# Patient Record
Sex: FEMALE | Race: OTHER | Employment: UNEMPLOYED | URBAN - METROPOLITAN AREA
[De-identification: names, ages, dates, MRNs, and addresses within clinical notes are randomized per-mention and may not be internally consistent; named-entity substitution may affect disease eponyms.]

---

## 2021-09-23 ENCOUNTER — HOSPITAL ENCOUNTER (OUTPATIENT)
Age: 26
Discharge: HOME | End: 2021-09-23
Attending: OBSTETRICS & GYNECOLOGY | Admitting: OBSTETRICS & GYNECOLOGY
Payer: COMMERCIAL

## 2021-09-23 VITALS
HEIGHT: 63 IN | SYSTOLIC BLOOD PRESSURE: 101 MMHG | RESPIRATION RATE: 18 BRPM | BODY MASS INDEX: 33.31 KG/M2 | DIASTOLIC BLOOD PRESSURE: 56 MMHG | HEART RATE: 102 BPM | TEMPERATURE: 99.2 F | WEIGHT: 188 LBS

## 2021-09-23 PROBLEM — Z34.90 PREGNANCY: Status: ACTIVE | Noted: 2021-09-23

## 2021-09-23 LAB
APPEARANCE UR: ABNORMAL
BILIRUB UR QL: NEGATIVE
COLOR UR: YELLOW
GLUCOSE UR QL STRIP.AUTO: NEGATIVE MG/DL
KETONES UR-MCNC: NEGATIVE MG/DL
LEUKOCYTE ESTERASE UR QL STRIP: ABNORMAL
NITRITE UR QL: NEGATIVE
PH UR: 7 [PH] (ref 5–9)
PROT UR QL: ABNORMAL MG/DL
RBC # UR STRIP: ABNORMAL /UL
SERVICE CMNT-IMP: ABNORMAL
SERVICE CMNT-IMP: NORMAL
SP GR UR: 1.02 (ref 1–1.02)
UROBILINOGEN UR QL: 0.2 EU/DL (ref 0.2–1)
WET PREP GENITAL: NORMAL

## 2021-09-23 PROCEDURE — 99284 EMERGENCY DEPT VISIT MOD MDM: CPT

## 2021-09-23 PROCEDURE — 59025 FETAL NON-STRESS TEST: CPT

## 2021-09-23 PROCEDURE — 87086 URINE CULTURE/COLONY COUNT: CPT

## 2021-09-23 PROCEDURE — 81003 URINALYSIS AUTO W/O SCOPE: CPT

## 2021-09-23 PROCEDURE — 87210 SMEAR WET MOUNT SALINE/INK: CPT

## 2021-09-23 RX ORDER — GLUCOSAMINE SULFATE 1500 MG
POWDER IN PACKET (EA) ORAL DAILY
COMMUNITY

## 2021-09-23 RX ORDER — ASPIRIN 325 MG
1 TABLET, DELAYED RELEASE (ENTERIC COATED) ORAL
Qty: 45 G | Refills: 0 | Status: SHIPPED | OUTPATIENT
Start: 2021-09-23 | End: 2021-10-30

## 2021-09-23 NOTE — PROGRESS NOTES
1915 this RN assumes care for the pt at this time    5 RN and KAREN Ponce bedside.  #[id number]  Pt is a 22 y.o. No obstetric history on file. at Unknown, arrived to L&D triage with c/o small amount of vaginal spotting around 1720. Denies recent intercourse. Reports positive fetal movement. Reports some vomiting last Sunday and some abdominal pain. EFM and TOCO applied, call bell within reach. 1928 speculum exam in process using  without video. Wet prep sample obtained. No blood visualized. 1932 SVE 1/thick/high. 2115 RN bedside using  #[id number]. Pt discharged home. Verbal and written discharge instructions given including LOF, vaginal bleeding, kick counts, s/s of active labor, importance of drinking plenty of fluids, eating regular meals, and keeping scheduled appointments. Pt verbalizes understanding. Pt states she has a ride home from hospital.    2127 Pt ambulated off unit in stable condition.

## 2021-09-24 NOTE — DISCHARGE INSTRUCTIONS
Patient Education   Patient Education   Patient Education   Patient Education   Patient Education   Patient Education        Semanas 34 a 39 de taylor embarazo: Instrucciones de cuidado  Weeks 34 to 36 of Your Pregnancy: Care Instructions  Instrucciones de cuidado    A estas [place], taylor bebé y taylor abdomen habrán crecido considerablemente. Mili es Gould de bishop a paco. Los pulmones de taylor bebé están mili listos para respirar aire. Los huesos de la tank de taylor bebé ahora son bastante firmes albert para protegerla ashley se mantienen lo suficientemente blandos albert para atravesar el canal de [place]. Es posible que sienta entusiasmo, martin, ansiedad o miedo. Quizá se pregunte cómo se dará cuenta de si está en trabajo de parto o qué esperar en sarita momento. Trate de ser flexible con nadira expectativas respecto del nacimiento. Dado que cada nacimiento es diferente, no hay manera de saber exactamente cómo será taylor parto. Esta hoja de cuidados la ayudará a saber qué esperar y cómo prepararse. Le podría facilitar el parto. Si todavía no le marie aplicado la vacuna Tdap (tétanos, difteria y tos [place]) marline jovana [facility], hable con taylor médico acerca de aplicársela. Titi Adame a proteger a taylor recién nacido contra la infección por tos ferina. En la semana 36, a la mayoría de las mujeres se les hace babatunde prueba de estreptococos del simone B (GBS, por nadira siglas en inglés). Los estreptococos del simone B son bacterias comunes que pueden vivir en la vagina y el recto. Pueden hacer que taylor bebé se enferme después del parto. Si el resultado es positivo, usted recibirá antibióticos marline el trabajo de [place]. Los medicamentos evitarán que taylor bebé contraiga las bacterias. La atención de seguimiento es babatunde parte clave de taylor tratamiento y seguridad. Asegúrese de hacer y acudir a todas las citas, y llame a taylor médico si está teniendo problemas.  También es babatunde buena idea saber los resultados de nadira exámenes y mantener babatunde lista de los medicamentos que geovanna.  ¿Cómo puede cuidarse en el hogar? Aprenda sobre las alternativas para aliviar el dolor  · El dolor se manifiesta de modo diferente en cada angela. Hable con taylor médico acerca de nadira sentimientos sobre el dolor. · Puede elegir entre varias formas de aliviar el dolor. Estas incluyen medicamentos o técnicas de respiración, así albert medidas para estar cómoda. Usted puede utilizar más de Edgar Fury opción. · Si elige un analgésico (medicamento para el dolor) marline el trabajo de [place], hable con taylor médico acerca de nadira opciones. Algunos medicamentos reducen la ansiedad y [place] [facility] a aliviar parte del dolor. Otros adormecen la parte inferior del cuerpo para que no sienta dolor. · Asegúrese de decirle a taylor médico acerca de taylor elección de analgésico antes de empezar el trabajo de parto o muy temprano en el [place]. Es posible que pueda cambiar de parecer a medida que avanza el [place]. · Jessica vez se duerme a babatunde angela con medicamentos administrados a través de babatunde máscara o por vía intravenosa (IV). Trabajo de parto y [place]  · La primera etapa del [place] de parto se divide en keiko fases: Michalene Dural y de transición. ? La mayoría de las mujeres experimentan la fase latente del [place]o en nadira hogares. Usted puede [employer] o descansar, comer refrigerios livianos, beber líquidos mayte y comenzar a contar las contracciones. ? Cuando advierta que se le vuelve difícil hablar marline babatunde contracción, es posible que esté por pasar a la fase activa. Marline la fase Angel Grandchild, debería ir al hospital si no está allí aún. ? Usted está en la fase activa cuando tiene contracciones cada 3 o 4 minutos y balbuena alrededor de 60 segundos. El skyla uterino comienza a abrirse con más rapidez.  ? Si se le rompe la debbie, las contracciones serán más intensas y más frecuentes. ? Marline la fase de transición, el skyla uterino se estira y las contracciones se producen con Jorge Irineo.   ? Fabiano Willie tenga deseos de pujar, sin embargo es posible que el skyla uterino aún no esté preparado. El médico le dirá cuándo pujar. · La segunda etapa comienza cuando el skyla uterino se abre por completo y usted está lista para pujar. ? Las contracciones son muy intensas a fin de empujar al bebé por el canal de parto. ? Sentirá la necesidad de pujar. Podría sentir albert si tuviera ganas de evacuar el intestino. ? Ebb Dee Dee entrenen a [employer]. Estas contracciones serán muy intensas ashley no ocurrirán con tanta frecuencia. Puede descansar un poco entre contracciones. ? Es posible que esté sensible e irritable. Es posible que no se dé cuenta de lo que pasa a taylor alrededor. ? Un último esfuerzo y habrá nacido taylor bebé. · La tercera etapa ocurre cuando con unas cuantas contracciones más se expulsa la placenta. [place] puede durar 30 minutos o menos. · La cuarta etapa es la de recuperación. Es posible que se sienta abrumada con las emociones y exhausta ashley alerta. Madelin es un buen momento para comenzar el amamantamiento. ¿Dónde puede encontrar más información en inglés? Vaya a [email or web address]  Sonia Roca [id number] en la búsqueda para aprender más acerca de \"Semanas 34 a 39 de taylor embarazo: Instrucciones de cuidado. \"  Revisado: 16 junio, 2021               Versión del contenido: 13.0  © 5176-0767 Healthwise, Incorporated. Las instrucciones de cuidado fueron adaptadas bajo licencia por Good Help Connections (which disclaims liability or warranty for this information). Si usted tiene [facility] afección médica o sobre estas instrucciones, siempre pregunte a taylor profesional de jarocho. [employer]ner[employer] Incorporated niega toda garantía o responsabilidad por taylor uso de esta información. Presión arterial berto marline el embarazo:  Instrucciones de cuidado  High Blood Pressure in Pregnancy: Care Instructions  Generalidades     Presión arterial berto (hipertensión) significa que la fuerza de la sue contra las azevedo de las arterias es demasiado parvin. Los problemas de presión arterial berto marline el embarazo incluyen:  · Hipertensión crónica. Esta es la presión arterial berto que comienza antes del embarazo. · Hipertensión gestacional. Esta es la presión arterial berto que comienza en el luis a o tercer trimestre del Sharad Spark. · Preeclampsia. Madelin es un problema que incluye presión arterial berto y señales de lesión orgánica marline el Sharad Spark. En algunos casos, conduce a eclampsia. La eclampsia provoca convulsiones. La presión arterial berto marline el embarazo puede afectar la cantidad de oxígeno y nutrientes que recibe taylor bebé. [place] puede afectar el crecimiento del bebé. La presión arterial berto también puede causar otros problemas graves tanto para usted albert para taylor bebé, albert desprendimiento de placenta. Para prevenir los [place], usted y taylor bebé serán supervisados muy de cerca. Tendrá que medirse la presión arterial con frecuencia marline el embarazo y posiblemente después de él. Si taylor presión arterial se eleva repentinamente o es muy berto marline el embarazo, el médico podría recetarle medicamentos. Estos generalmente pueden controlar la presión arterial.  Si taylor presión arterial afecta taylor jarocho o la de taylor bebé, es posible que taylor médico deba traer al mansoor al bebé antes de [place]. Después de que taylor bebé nazca, la presión arterial probablemente mejorará. Meche, a veces, los problemas de presión arterial continúan después del parto. La atención de seguimiento es babatunde parte clave de taylor tratamiento y seguridad. Asegúrese de hacer y acudir a todas las citas, y llame a taylor médico si está teniendo problemas. También es babatunde buena idea saber los resultados de nadira exámenes y mantener babatunde lista de los medicamentos que geovanna. ¿Cómo puede cuidarse en el hogar? · Tómese y anote la presión arterial en el hogar si el médico le dice que lo mar.   · [employer] medicamentos exactamente KB Sells	[place] fueron recetados. Llame a taylor médico si tristan estar teniendo problemas con taylor medicamento. · No fume. Esta es babatunde de las mejores cosas que puede hacer para que taylor bebé esté maria eugenia. Si necesita ayuda para dejar de fumar, hable con taylor médico sobre programas y medicamentos para dejar de fumar. Estos pueden aumentar nadira probabilidades de dejar de fumar para siempre. · No suba mucho de peso marline el embarazo. Hable con taylor médico acerca de cuánto aumento de peso es saludable. · Siga babatunde dieta saludable. · Si taylor médico lo autoriza, mar ejercicio en forma regular. Caminar o nadar varias veces a la semana puede ser saludable para usted y para taylor bebé. · Reduzca el estrés y encuentre tiempo para relajarse. [place] es muy importante si sigue trabajando o tiene babatunde agenda [place]. También es importante si tiene niños pequeños en casa. ¿Cuándo debe pedir ayuda? Comparta esta información con taylor missy o babatunde amiga. Pueden ayudarla a prestar atención a las señales de advertencia. Llame al 911  en cualquier momento que considere que necesita atención de [place]. Por ejemplo, llame si:    · Se desmayó (perdió el conocimiento).     · Tiene convulsiones. Llame a taylor médico ahora mismo o busque atención médica inmediata si:    · Tiene síntomas de preeclampsia, tales albert:  ? Hinchazón repentina de la arsenio, las jaylin o los pies. ? Nuevos problemas de visión (albert oscurecimiento, al borroso o al puntos). ? Dolor de tank intenso.     · Atylor presión arterial es muy berto, por ejemplo, 160/110 o más berto.     · Taylor presión arterial es más berto de lo que el médico le dijo que debería ser, o se eleva rápidamente.     · Tiene náuseas o vómitos nuevos.     · Piensa que está de parto.     · Tiene dolor en el abdomen o en la pelvis. Preste especial atención a los cambios en taylor jarocho y asegúrese de comunicarse con taylor médico si:    · Aumenta de peso con Leoncio Pack. ¿Dónde puede encontrar más información en inglés?   Dragan July a [email or web address]  Escriba [id number] en la búsqueda para aprender más acerca de \"Presión arterial berto marline el embarazo: Instrucciones de cuidado. \"  Revisado: 16 junio, 2021               Versión del contenido: 13.0  © 3437-1178 Healthwise, Incorporated. Las instrucciones de cuidado fueron adaptadas bajo licencia por Good SinCola Connections (which disclaims liability or warranty for this information). Si usted tiene [place] afección médica o sobre estas instrucciones, siempre pregunte a taylor profesional de jarocho. Healthwise, Incorporated niega toda garantía o responsabilidad por taylor uso de esta información. Precauciones en el embarazo: Instrucciones de cuidado  Pregnancy Precautions: Care Instructions  Instrucciones de cuidado     No hay babatunde manera castillo de prevenir el trabajo de parto antes de la fecha esperada (trabajo de parto prematuro) o de prevenir la mayoría de otros problemas en el [facility]. Meche hay cosas que puede hacer para aumentar las probabilidades de tener un embarazo saludable. Vaya a nadira citas, siga los consejos de taylor médico y cuídese. Coma evita y mar ejercicio (si taylor médico lo permite). Y asegúrese de tammie abundante agua. La atención de seguimiento es babatunde parte clave de taylor tratamiento y seguridad. Asegúrese de hacer y acudir a todas las citas, y llame a taylor médico si está teniendo problemas. También es babatunde buena idea saber los resultados de nadira exámenes y mantener babatunde lista de los medicamentos que geovanna. ¿Cómo puede cuidarse en el hogar? · Asegúrese de asistir a las citas prenatales. Taylor médico le tomará la presión arterial en cada consulta. Taylor médico también comprobará si tiene proteínas en taylor orina. Tanto la presión arterial berto albert la presencia de proteínas en la orina son señales de preeclampsia. Esta afección puede ser peligrosa tanto para usted albert para taylor bebé. · Ludivina mucho líquido. La deshidratación puede causar contracciones.  Si tiene babatunde enfermedad renal, cardíaca o hepática y tiene que restringir los líquidos, hable con machuca médico antes de aumentar la cantidad de líquido que toñito. · Informe a machuca médico de inmediato si nota algún síntoma de infección, albert:  ? Ardor cuando orina. ? Flujo con mal olor de la vagina. ? Comezón en la vagina. ? Cris Areola sin explicación. ? Dolor o sensibilidad inusual en el útero o la parte baja del abdomen. · Aliméntese en forma equilibrada. Incluya muchos alimentos con alto contenido de calcio y dharmesh. ? Entre los alimentos ricos en calcio se incluyen la [place], el queso, el yogur, Yulissa Grandchild y el brócoli. ? Entre los alimentos ricos en dharmesh se incluyen las lety peralta, los [place], las aves, los [place], los frijoles, las uvas pasas, el pan de grano integral y las verduras de hojas verdes. · No fume. Si necesita ayuda para dejar de fumar, hable con machuca médico sobre programas y medicamentos para dejar de fumar. Estos pueden aumentar nadira probabilidades de dejar el hábito para siempre. · No celestina alcohol ni consuma marihuana o drogas ilegales. · Siga las instrucciones de machuca médico acerca de la [place]. Machuca médico le dirá cuánto ejercicio puede hacer. · Pregúntele a machuca médico si puede tener [employer]. Si usted está en riesgo de tener trabajo de [place], machuca médico podría pedirle que no tenga relaciones sexuales. · Pinal precauciones para prevenir las caídas. [place] el embarazo las articulaciones están más sueltas y se tiene menos equilibrio. Los deportes tales albert el ciclismo, el esquí o el patinaje en línea pueden aumentar el riesgo de caídas. Y no monte a sammie, aura en motocicleta, mar clavados, mar esquí acuático, bucee, ni salte en paracaídas mientras está embarazada. · Evite calentarse demasiado. No use saunas ni bañeras de hidromasaje. Evite la exposición al sol en climas calientes por mucho tiempo. Pinal acetaminofén (Tylenol) para bajar babatunde fiebre berto.   · No tome medicamentos de venta morris, productos herbarios ni suplementos sin hablar blanca con taylor médico o farmacéutico.  ¿Cuándo debe pedir ayuda? Llame al 911  en cualquier momento que considere que necesita atención de [place]. Por ejemplo, llame si:    · Se desmayó (perdió el conocimiento).     · Tiene convulsiones.     · Tiene sangrado vaginal intenso.     · Tiene dolor intenso en el abdomen o la pelvis.     · Le sale abundante líquido o gotea de la vagina y sabe o tristan que el cordón umbilical se está saliendo a taylor vagina. Si esto sucede, arrodíllese de inmediato, de aditi forma que nadira nalgas estén más altas que taylor tank. [place] disminuirá la presión sobre el cordón umbilical hasta que llegue la [facility]. Llame a taylor médico ahora mismo o busque atención médica inmediata si:    · Tiene señales de preeclampsia, albert:  ? Hinchazón repentina de la arsenio, las jaylin o los pies. ? Nuevos problemas de visión (albert oscurecimiento, al borroso o al puntos). ? Dolor de tank intenso.     · Tiene cualquier sangrado vaginal.     · Tiene dolor o cólicos abdominales.     · Tiene fiebre.     · Jesus Downer tenido contracciones regulares (con o sin dolor) por Orvis Cartuan. Patriot significa que tiene 8 o más contracciones en 1 hora o que tiene 4 contracciones o más en 20 minutos después de [place] de posición y tammie líquidos.     · Tiene babatunde pérdida repentina de líquido por la vagina.     · Tiene dolor en la parte baja de la espalda o presión en la pelvis que no desaparece.     · Nota que taylor bebé ha dejado de moverse o se mueve mucho menos de lo normal.   Preste especial atención a los cambios en taylor jarocho y asegúrese de comunicarse con taylor médico si tiene algún problema. ¿Dónde puede encontrar más información en inglés? Maria L Colemanon a [email or web address]  Lawrence Faes [id number] en la búsqueda para aprender más acerca de \"Precauciones en el embarazo: Instrucciones de cuidado. \"  Revisado: 16 junio, 2021               Versión del contenido: 13.0  © 2006-2021 Healthwise, Incorporated. Las instrucciones de cuidado fueron adaptadas bajo licencia por Good [employer] Connections (which disclaims liability or warranty for this information). Si usted tiene [place] afección médica o sobre estas instrucciones, siempre pregunte a taylor profesional de jarocho. Healthwise, Incorporated niega toda garantía o responsabilidad por taylor uso de esta información. Conteo de las patadas de taylor bebé: Instrucciones de cuidado  [employer] Your Baby's Kicks: Care Instructions  Generalidades     Contar las patadas de taylor bebé es babatunde manera en la que taylor médico puede establecer si taylor bebé es saludable. La mayoría de las [facility], sobre todo en el primer embarazo, siente que taylor bebé se mueve por primera vez entre las semanas 12 y 25. El movimiento puede sentirse más albert aleteos que albert patadas. Es posible que taylor bebé se mueva más a ciertas horas del día. Cuando medhat [place], puede notar menos patadas que cuando está descansando. En nadira visitas prenatales, taylor médico le preguntará si el bebé está activo. En el último trimestre, taylor médico le puede pedir que cuente la cantidad de veces que sienta que el bebé se [place]. La atención de seguimiento es babatunde parte clave de taylor tratamiento y seguridad. Asegúrese de hacer y acudir a todas las citas, y llame a taylor médico si está teniendo problemas. También es babatunde buena idea saber los resultados de nadira exámenes y mantener babatunde lista de los medicamentos que geovanna. ¿Cómo se cuentan las patadas fetales? · Un método común para comprobar los movimientos del bebé es anotar el tiempo que se tarda en contar 10 movimientos (por ejemplo, patadas, aleteo o volteo). · Para contar, elija el momento del día en que taylor bebé esté más activo. Podría ser cualquier momento entre la mañana y la noche. · Si no siente 10 movimientos en babatunde hora, coma o celestina algo y cuente [place].  Llame a taylor médico si no siente al menos 10 movimientos en sarita período de 2 horas. ¿Cuándo debe pedir ayuda? Llame a taylor médico ahora mismo o busque atención médica inmediata si:    · Siente que taylor bebé ha dejado de moverse o se mueve mucho menos de lo normal.   Preste especial atención a los cambios en taylor jarocho y asegúrese de comunicarse con taylor médico si tiene cualquier problema. ¿Dónde puede encontrar más información en inglés? Judith Maidens a [email or web address]  Melita Cervantes [id number] en la búsqueda para aprender más acerca de \"Conteo de las patadas de taylor bebé: Instrucciones de cuidado. \"  Revisado: 16 junio, 2021               Versión del contenido: 13.0  © 7667-6458 Healthwise, Incorporated. Las instrucciones de cuidado fueron adaptadas bajo licencia por Good [employer]o [employer] Connections (which disclaims liability or warranty for this information). Si usted tiene [place] afección médica o sobre estas instrucciones, siempre pregunte a taylor profesional de jarocho. Healthwise, Incorporated niega toda garantía o responsabilidad por taylor uso de esta información. Contracciones de [place]: Instrucciones de cuidado  [place] Contractions: Care Instructions  Instrucciones de cuidado     Las contracciones de Leeds Zhang le preparan el útero para el [place]. Piense en ellas albert si fueran un ejercicio de \"precalentamiento\" que hace taylor cuerpo. Puede comenzar a sentirlas [employer] 28 y 27 del embarazo. Meche comienzan tan temprano albert en la semana 20. Las contracciones de [place] por lo general ocurren con mayor frecuencia marline el noveno mes. Pueden desaparecer cuando usted Lam Raider y regresar cuando descansa. Estas contracciones son albert contracciones leves del [place] real, aunque ocurren con sally frecuencia. (Usted siente menos de 8 en Sara Oden). No causan dilatación del skyla uterino.   Puede resultarle difícil diferenciar [employer] contracciones de [place] y el trabajo de parto real, sobre todo Faustino el primer Denyce Ralphs. La atención de seguimiento es babatunde parte clave de taylor tratamiento y seguridad. Asegúrese de hacer y acudir a todas las citas, y llame a taylor médico si está teniendo problemas. También es babatunde buena idea saber los resultados de nadira exámenes y mantener babatunde lista de los medicamentos que geovanna. ¿Cómo puede cuidarse en el hogar? · Pruebe un baño tibio para ayudar a aliviar la tensión muscular y reducir el dolor. · Cambie de posición cada 30 minutos. Tómese descansos si tiene que estar sentada por [employer]. Levántese a caminar. · [employer]. · Hacer unas caminatas cortas puede ayudarla a sentirse mejor. Las contracciones que se vuelvan más enrique o más frecuentes deben ser evaluadas por taylor médico.  ¿Dónde puede encontrar más información en inglés? Cosimo [facility] a [email or web address]  Jasiel Hernando [id number] en la búsqueda para aprender más acerca de \"Contracciones de [place]: Instrucciones de cuidado. \"  Revisado: 16 junio, 2021               Versión del contenido: 13.0  © 2728-5516 Healthwise, Incorporated. Las instrucciones de cuidado fueron adaptadas bajo licencia por Good [employer]icAhead Connections (which disclaims liability or warranty for this information). Si usted tiene [place] afección médica o sobre estas instrucciones, siempre pregunte a taylor profesional de jarocho. Healthwise, Incorporated niega toda garantía o responsabilidad por taylor uso de esta información. Fase inicial del trabajo de parto en el hogar: Instrucciones de cuidado  Early Stage of Labor at Home: Care Instructions  Generalidades     Si acudió al hospital mientras estaba en el comienzo del [place] médico podría decirle que continúe el trabajo de parto en taylor casa hasta que las contracciones se vuelvan más intensas. Muchas mujeres se quedan en casa en la etapa inicial del trabajo de Taylor. Esta suele ser la parte más larga del [place] de bishop a paco. Puede durar hasta 2 o 3 cindi.  Cassie Rousseau contracciones son de leves a moderadas y más cortas (alrededor de 30 a 39 segundos). Generalmente, usted puede seguir [place] tiene. Las contracciones también pueden ser irregulares, aproximadamente a intervalos de 5 a 20 minutos. Incluso pueden detenerse por un rato. Es útil permanecer lo más relajada que pueda marline [place]. Usted puede pasar babatunde parte de la etapa inicial del ViPending [facility] de parto, o toda, en taylor hogar o en cualquier otro lugar donde pueda estar cómoda. Si vive lejos del hospital o del centro de maternidad, aditi vez desee considerar ir a algún lugar cercano de modo que pueda volver rápidamente al hospital.  Faustine Lemme mujeres, puede pauly beneficios en quedarse en casa marline la etapa inicial del [facility], albert evitar medicamentos o procedimientos. A medida que avanza el trabajo de [place], usted pasará de la etapa inicial al [facility]. [address] contracciones se vuelven más intensas. Ocurren más a menudo, aproximadamente cada 2 o 3 minutos. También [employer], alrededor de 50 a 70 segundos. Las sentirá aun cuando cambia de posición y camina o se desplaza. Puede ser difícil de decir si está en trabajo de [facility] [place]. Si no está castillo, llame a taylor médico o partera. A medida que avanza el trabajo de [place], consulte con taylor médico o partera acerca de cuándo debería volver al hospital o [facility] LEZAMA. Es posible que le den instrucciones especiales si rompió la bolsa de romy o si se ha encontrado que tiene babatunde infección por estreptococos del [place]. La atención de seguimiento es babatunde parte clave de taylor tratamiento y seguridad. Asegúrese de hacer y acudir a todas las citas, y llame a taylor médico si está teniendo problemas. También es babatunde buena idea saber los resultados de nadira exámenes y mantener babatunde lista de los medicamentos que geovanna. ¿Cómo puede cuidarse en el hogar? · Consiga apoyo.  Tener babatunde persona de apoyo a taylor lado desde el principio del trabajo de parto hasta después del nacimiento puede tener un efecto positivo en el [place]. · Encuentre cosas que la distraigan. [place] la fase inicial del [place], usted puede caminar, jugar a las cartas, mirar televisión o escuchar música albert ayuda para distraerse de las contracciones. · Pídale a taylor missy, asistente de trabajo de parto o  que le mar un masaje. Los [employer] hombros y en la parte baja de la espalda marline las contracciones podrían aliviarle el dolor. Masajes enrique de los músculos de la espalda (contrapresión) marline las contracciones pueden ayudar a aliviar el dolor de espalda en el [place]. Dígale a taylor asistente del trabajo de parto exactamente dónde y con cuánta fuerza debe presionar. · Use imágenes. [place] consiste en usar taylor imaginación para reducir taylor dolor. Por ejemplo, para ayudar a manejar el dolor, visualice las contracciones albert olas a taylor alrededor. Imagine un lugar tranquilo, albert babatunde playa o un arroyo en [place], para que la ayude a relajarse entre las contracciones. · Cambie de posición marline el trabajo de [place]. Caminar, arrodillarse o sentarse en babatunde pelota sony de goma (pelota de parto) son Thermon Halim opciones. · Use técnicas de respiración concentrada. Respirar siguiendo un ritmo puede distraerle del dolor. · Dese babatunde ducha o baño tibios. El agua tibia puede aliviarle el dolor y el estrés. ¿Cuándo debe pedir ayuda? Llame al 911  en cualquier momento que considere que necesita atención de [place]. Por ejemplo, llame si:    · Se desmayó (perdió el conocimiento).     · Tiene convulsiones.     · Tiene sangrado vaginal intenso.     · Tiene dolor intenso en el abdomen o la pelvis que no mejora entre las contracciones.     · Le sale abundante líquido o gotea de la vagina y sabe o tristan que el cordón umbilical está empezando a salir por la vagina.  Si esto sucede, arrodíllese de inmediato de aditi forma que tenga las nalgas (glúteos) más altas que la tank. Ute disminuirá la presión sobre el cordón umbilical hasta que llegue ayuda. Llame a taylor médico ahora mismo o busque atención médica inmediata si:    · Tiene señales de preeclampsia nuevas o peores, albert:  ? Hinchazón repentina de la arsenio, las jaylin o los pies. ? Nuevos problemas de visión (albert oscurecimiento, al borroso o al puntos). ? Dolor de tank intenso.     · Tiene cualquier tipo de sangrado vaginal.     · Tiene dolor o cólicos abdominales.     · Tiene fiebre.     · [place] tenido contracciones regulares (con o sin dolor) marline babatunde hora. Ute significa que tiene 8 o más contracciones en 1 hora o que tiene 4 contracciones o más en 20 minutos después de [place] de posición y tammie líquidos.     · Tiene babatunde pérdida repentina de líquido por la vagina.     · Tiene dolor en la parte baja de la espalda o presión en la pelvis que no desaparece.     · Nota que taylor bebé ha dejado de moverse o lo hace mucho menos de lo habitual.   Preste especial atención a los cambios en taylor jarocho y asegúrese de comunicarse con taylor médico si tiene algún problema. ¿Dónde puede encontrar más información en inglés? José Menjivar a http://www.gray.com/  Abner H239 en la búsqueda para aprender más acerca de \"Fase inicial del trabajo de parto en el hogar: Instrucciones de cuidado. \"  Revisado: 16 junio, 2021               Versión del contenido: 13.0  © 2969-6688 Healthwise, Incorporated. Las instrucciones de cuidado fueron adaptadas bajo licencia por [employer] (which disclaims liability or warranty for this information). Si usted tiene [place] afección médica o sobre estas instrucciones, siempre pregunte a taylor profesional de jarocho. [facility] niega toda garantía o responsabilidad por taylor uso de esta información.

## 2021-09-24 NOTE — PROGRESS NOTES
HPI:    Subjective:     Nena Call, 22 y.o.   at 35w1d presents to L&D with c/o Vaginal bleeding: spotting after urinating at 1730 this afternoon. Assessment:  Past Medical History:   Diagnosis Date    Diabetes [facility])     patient reports her doctor is having her take her blood sugar before and after meals    Gestational hypertension     preeclampsia in first pregnancy     Past Surgical History:   Procedure Laterality Date    HX  SECTION         No Known Allergies  Prior to Admission medications    Medication Sig Start Date End Date Taking? Authorizing Provider   Iron BisGl &PS [id number] mg-mg-mcg-mg cap Take  by mouth. Yes Provider, Historical   cholecalciferol (Vitamin D3) 25 mcg (1,000 unit) cap Take  by mouth daily. Yes Provider, Historical   clotrimazole (MYCELEX) 1 % vaginal cream Insert 1 Applicator into vagina nightly. 21  Yes Bri Ni CNM        Objective:     Vitals:  Vitals:    21 1949   BP: 103/63   Pulse: 100   Resp: 18   Temp: 99.2 °F (37.3 °C)   Weight: 85.3 kg (188 lb)   Height: 5' 3\" (1.6 m)        Physical Exam:  Patient without distress. Heart: Regular rate and rhythm, S1S2 present or without murmur or extra heart sounds  Lung: clear to auscultation throughout lung fields, no wheezes, no rales, no rhonchi and normal respiratory effort  Abdomen: soft, nontender  Fundus: soft and non tender  Perineum: blood absent, amniotic fluid absent  Cervical Exam: 1 cm dilated    0% effaced    -3 station    Membranes:  Intact  Fetal Heart Rate: Baseline: 130 per minute  Variability: moderate  Accelerations: yes  Decelerations: late  Cervical exam: Dilated:  1 cm                             Effacement: 0%                             Station: -3  U/A: Urine dipstick shows positive for red blood cells, protein. Assessment/Plan: Pt presented for 2 episodes of spotting after in office visit today.  Denies cervical exam or recent intercourse. Speculum exam performed, no bleeding from cervical os. Pt found exams extremely uncomfortable, cheesy discharge noted on speculum. Will treat for suspected yeast infection. Send UA for culture. Plan: DC home with standard & ER labor precautions. Follow-up in office for routine visit.      Signed By:  Shital Sheikh CNM     September 23, 2021

## 2021-09-25 LAB
BACTERIA SPEC CULT: NORMAL
CC UR VC: NORMAL
SERVICE CMNT-IMP: NORMAL

## 2021-10-22 ENCOUNTER — HOSPITAL ENCOUNTER (OUTPATIENT)
Dept: PREADMISSION TESTING | Age: 26
Discharge: HOME | End: 2021-10-22
Payer: COMMERCIAL

## 2021-10-22 PROCEDURE — U0003 INFECTIOUS AGENT DETECTION BY NUCLEIC ACID (DNA OR RNA); SEVERE ACUTE RESPIRATORY SYNDROME CORONAVIRUS 2 (SARS-COV-2) (CORONAVIRUS DISEASE [COVID-19]), AMPLIFIED PROBE TECHNIQUE, MAKING USE OF HIGH THROUGHPUT TECHNOLOGIES AS DESCRIBED BY CMS-2020-01-R: HCPCS

## 2021-10-23 LAB — SARS-COV-2, COV2NT: NOT DETECTED

## 2021-10-27 ENCOUNTER — ANESTHESIA EVENT (OUTPATIENT)
Dept: LABOR AND DELIVERY | Age: 26
End: 2021-10-27
Payer: COMMERCIAL

## 2021-10-27 ENCOUNTER — HOSPITAL ENCOUNTER (INPATIENT)
Age: 26
LOS: 3 days | Discharge: HOME | End: 2021-10-30
Attending: OBSTETRICS & GYNECOLOGY | Admitting: OBSTETRICS & GYNECOLOGY
Payer: COMMERCIAL

## 2021-10-27 ENCOUNTER — ANESTHESIA (OUTPATIENT)
Dept: LABOR AND DELIVERY | Age: 26
End: 2021-10-27
Payer: COMMERCIAL

## 2021-10-27 DIAGNOSIS — Z98.891 S/P C-SECTION: Primary | ICD-10-CM

## 2021-10-27 PROBLEM — O24.419 GESTATIONAL DIABETES: Status: ACTIVE | Noted: 2021-10-27

## 2021-10-27 PROBLEM — Z3A.40 40 WEEKS GESTATION OF PREGNANCY: Status: ACTIVE | Noted: 2021-10-27

## 2021-10-27 LAB
ABO + RH BLD: NORMAL
BASOPHILS # BLD: 0 K/UL (ref 0–0.1)
BASOPHILS NFR BLD: 0 % (ref 0–2)
BLOOD GROUP ANTIBODIES SERPL: NORMAL
DIFFERENTIAL METHOD BLD: ABNORMAL
EOSINOPHIL # BLD: 0.1 K/UL (ref 0–0.4)
EOSINOPHIL NFR BLD: 1 % (ref 0–5)
ERYTHROCYTE [DISTWIDTH] IN BLOOD BY AUTOMATED COUNT: 12.7 % (ref 11.6–14.5)
GLUCOSE SERPL-MCNC: 96 MG/DL (ref 74–99)
HCT VFR BLD AUTO: 35.9 % (ref 35–45)
HGB BLD-MCNC: 11.6 G/DL (ref 12–16)
LYMPHOCYTES # BLD: 1.6 K/UL (ref 0.9–3.6)
LYMPHOCYTES NFR BLD: 23 % (ref 21–52)
MCH RBC QN AUTO: 30 PG (ref 24–34)
MCHC RBC AUTO-ENTMCNC: 32.3 G/DL (ref 31–37)
MCV RBC AUTO: 92.8 FL (ref 78–100)
MONOCYTES # BLD: 0.9 K/UL (ref 0.05–1.2)
MONOCYTES NFR BLD: 12 % (ref 3–10)
NEUTS SEG # BLD: 4.6 K/UL (ref 1.8–8)
NEUTS SEG NFR BLD: 64 % (ref 40–73)
PLATELET # BLD AUTO: 275 K/UL (ref 135–420)
PMV BLD AUTO: 10.7 FL (ref 9.2–11.8)
RBC # BLD AUTO: 3.87 M/UL (ref 4.2–5.3)
SPECIMEN EXP DATE BLD: NORMAL
WBC # BLD AUTO: 7.2 K/UL (ref 4.6–13.2)

## 2021-10-27 PROCEDURE — 82947 ASSAY GLUCOSE BLOOD QUANT: CPT

## 2021-10-27 PROCEDURE — 74011000250 HC RX REV CODE- 250: Performed by: ANESTHESIOLOGY

## 2021-10-27 PROCEDURE — 75410000003 HC RECOV DEL/VAG/CSECN EA 0.5 HR

## 2021-10-27 PROCEDURE — 74011250636 HC RX REV CODE- 250/636: Performed by: OBSTETRICS & GYNECOLOGY

## 2021-10-27 PROCEDURE — 75410000003 HC RECOV DEL/VAG/CSECN EA 0.5 HR: Performed by: OBSTETRICS & GYNECOLOGY

## 2021-10-27 PROCEDURE — 2709999900 HC NON-CHARGEABLE SUPPLY: Performed by: OBSTETRICS & GYNECOLOGY

## 2021-10-27 PROCEDURE — 59025 FETAL NON-STRESS TEST: CPT

## 2021-10-27 PROCEDURE — 77030027138 HC INCENT SPIROMETER -A

## 2021-10-27 PROCEDURE — 76060000032 HC ANESTHESIA 0.5 TO 1 HR: Performed by: OBSTETRICS & GYNECOLOGY

## 2021-10-27 PROCEDURE — 77030008459 HC STPLR SKN COOP -B: Performed by: OBSTETRICS & GYNECOLOGY

## 2021-10-27 PROCEDURE — 77030040361 HC SLV COMPR DVT MDII -B: Performed by: OBSTETRICS & GYNECOLOGY

## 2021-10-27 PROCEDURE — 76010000391 HC C SECN FIRST 1 HR: Performed by: OBSTETRICS & GYNECOLOGY

## 2021-10-27 PROCEDURE — 74011250636 HC RX REV CODE- 250/636: Performed by: ANESTHESIOLOGY

## 2021-10-27 PROCEDURE — 77030014144 HC TY SPN ANES BBMI -B: Performed by: ANESTHESIOLOGY

## 2021-10-27 PROCEDURE — 65270000029 HC RM PRIVATE

## 2021-10-27 PROCEDURE — 85025 COMPLETE CBC W/AUTO DIFF WBC: CPT

## 2021-10-27 PROCEDURE — 77030031139 HC SUT VCRL2 J&J -A: Performed by: OBSTETRICS & GYNECOLOGY

## 2021-10-27 PROCEDURE — 86901 BLOOD TYPING SEROLOGIC RH(D): CPT

## 2021-10-27 RX ORDER — OXYCODONE AND ACETAMINOPHEN 5; 325 MG/1; MG/1
1-2 TABLET ORAL
Status: DISCONTINUED | OUTPATIENT
Start: 2021-10-27 | End: 2021-10-30 | Stop reason: HOSPADM

## 2021-10-27 RX ORDER — CEFAZOLIN SODIUM/WATER 2 G/20 ML
2 SYRINGE (ML) INTRAVENOUS ONCE
Status: COMPLETED | OUTPATIENT
Start: 2021-10-27 | End: 2021-10-27

## 2021-10-27 RX ORDER — SODIUM CHLORIDE, SODIUM LACTATE, POTASSIUM CHLORIDE, CALCIUM CHLORIDE 600; 310; 30; 20 MG/100ML; MG/100ML; MG/100ML; MG/100ML
25 INJECTION, SOLUTION INTRAVENOUS CONTINUOUS
Status: DISCONTINUED | OUTPATIENT
Start: 2021-10-27 | End: 2021-10-30 | Stop reason: HOSPADM

## 2021-10-27 RX ORDER — OXYTOCIN/RINGER'S LACTATE 30/500 ML
10 PLASTIC BAG, INJECTION (ML) INTRAVENOUS AS NEEDED
Status: DISCONTINUED | OUTPATIENT
Start: 2021-10-27 | End: 2021-10-30 | Stop reason: HOSPADM

## 2021-10-27 RX ORDER — SIMETHICONE 80 MG
80 TABLET,CHEWABLE ORAL
Status: DISCONTINUED | OUTPATIENT
Start: 2021-10-27 | End: 2021-10-30 | Stop reason: HOSPADM

## 2021-10-27 RX ORDER — OXYTOCIN/0.9 % SODIUM CHLORIDE 30/500 ML
87.3 PLASTIC BAG, INJECTION (ML) INTRAVENOUS AS NEEDED
Status: COMPLETED | OUTPATIENT
Start: 2021-10-27 | End: 2021-10-27

## 2021-10-27 RX ORDER — ONDANSETRON 2 MG/ML
4 INJECTION INTRAMUSCULAR; INTRAVENOUS
Status: DISCONTINUED | OUTPATIENT
Start: 2021-10-27 | End: 2021-10-30 | Stop reason: HOSPADM

## 2021-10-27 RX ORDER — SODIUM CHLORIDE, SODIUM LACTATE, POTASSIUM CHLORIDE, CALCIUM CHLORIDE 600; 310; 30; 20 MG/100ML; MG/100ML; MG/100ML; MG/100ML
125 INJECTION, SOLUTION INTRAVENOUS CONTINUOUS
Status: DISPENSED | OUTPATIENT
Start: 2021-10-27 | End: 2021-10-28

## 2021-10-27 RX ORDER — ACETAMINOPHEN 325 MG/1
650 TABLET ORAL
Status: DISCONTINUED | OUTPATIENT
Start: 2021-10-27 | End: 2021-10-30 | Stop reason: HOSPADM

## 2021-10-27 RX ORDER — HYDROMORPHONE HYDROCHLORIDE 1 MG/ML
0.2 INJECTION, SOLUTION INTRAMUSCULAR; INTRAVENOUS; SUBCUTANEOUS AS NEEDED
Status: DISCONTINUED | OUTPATIENT
Start: 2021-10-27 | End: 2021-10-30 | Stop reason: HOSPADM

## 2021-10-27 RX ORDER — ONDANSETRON 2 MG/ML
INJECTION INTRAMUSCULAR; INTRAVENOUS AS NEEDED
Status: DISCONTINUED | OUTPATIENT
Start: 2021-10-27 | End: 2021-10-27 | Stop reason: HOSPADM

## 2021-10-27 RX ORDER — SODIUM CHLORIDE 0.9 % (FLUSH) 0.9 %
5-40 SYRINGE (ML) INJECTION EVERY 8 HOURS
Status: DISCONTINUED | OUTPATIENT
Start: 2021-10-27 | End: 2021-10-30

## 2021-10-27 RX ORDER — SODIUM CHLORIDE, SODIUM LACTATE, POTASSIUM CHLORIDE, CALCIUM CHLORIDE 600; 310; 30; 20 MG/100ML; MG/100ML; MG/100ML; MG/100ML
125 INJECTION, SOLUTION INTRAVENOUS CONTINUOUS
Status: DISCONTINUED | OUTPATIENT
Start: 2021-10-27 | End: 2021-10-27 | Stop reason: HOSPADM

## 2021-10-27 RX ORDER — FACIAL-BODY WIPES
10 EACH TOPICAL
Status: DISCONTINUED | OUTPATIENT
Start: 2021-10-27 | End: 2021-10-30 | Stop reason: HOSPADM

## 2021-10-27 RX ORDER — KETOROLAC TROMETHAMINE 30 MG/ML
30 INJECTION, SOLUTION INTRAMUSCULAR; INTRAVENOUS EVERY 6 HOURS
Status: DISPENSED | OUTPATIENT
Start: 2021-10-27 | End: 2021-10-29

## 2021-10-27 RX ORDER — DIPHENHYDRAMINE HYDROCHLORIDE 50 MG/ML
12.5 INJECTION, SOLUTION INTRAMUSCULAR; INTRAVENOUS
Status: DISCONTINUED | OUTPATIENT
Start: 2021-10-27 | End: 2021-10-30 | Stop reason: HOSPADM

## 2021-10-27 RX ORDER — BUPIVACAINE HYDROCHLORIDE 7.5 MG/ML
INJECTION, SOLUTION EPIDURAL; RETROBULBAR
Status: SHIPPED | OUTPATIENT
Start: 2021-10-27 | End: 2021-10-27

## 2021-10-27 RX ORDER — SODIUM CHLORIDE 0.9 % (FLUSH) 0.9 %
5-40 SYRINGE (ML) INJECTION EVERY 8 HOURS
Status: DISCONTINUED | OUTPATIENT
Start: 2021-10-27 | End: 2021-10-28 | Stop reason: SDUPTHER

## 2021-10-27 RX ORDER — PROMETHAZINE HYDROCHLORIDE 25 MG/ML
25 INJECTION, SOLUTION INTRAMUSCULAR; INTRAVENOUS
Status: DISCONTINUED | OUTPATIENT
Start: 2021-10-27 | End: 2021-10-30 | Stop reason: HOSPADM

## 2021-10-27 RX ORDER — LANOLIN ALCOHOL/MO/W.PET/CERES
3 CREAM (GRAM) TOPICAL
Status: DISCONTINUED | OUTPATIENT
Start: 2021-10-27 | End: 2021-10-30 | Stop reason: HOSPADM

## 2021-10-27 RX ORDER — MORPHINE SULFATE 1 MG/ML
INJECTION, SOLUTION EPIDURAL; INTRATHECAL; INTRAVENOUS
Status: SHIPPED | OUTPATIENT
Start: 2021-10-27 | End: 2021-10-27

## 2021-10-27 RX ORDER — DIPHENHYDRAMINE HYDROCHLORIDE 50 MG/ML
25 INJECTION, SOLUTION INTRAMUSCULAR; INTRAVENOUS
Status: DISCONTINUED | OUTPATIENT
Start: 2021-10-27 | End: 2021-10-30 | Stop reason: HOSPADM

## 2021-10-27 RX ORDER — SODIUM CHLORIDE 0.9 % (FLUSH) 0.9 %
5-40 SYRINGE (ML) INJECTION AS NEEDED
Status: DISCONTINUED | OUTPATIENT
Start: 2021-10-27 | End: 2021-10-28 | Stop reason: SDUPTHER

## 2021-10-27 RX ORDER — SODIUM CHLORIDE 0.9 % (FLUSH) 0.9 %
5-40 SYRINGE (ML) INJECTION AS NEEDED
Status: DISCONTINUED | OUTPATIENT
Start: 2021-10-27 | End: 2021-10-30 | Stop reason: HOSPADM

## 2021-10-27 RX ORDER — ONDANSETRON 2 MG/ML
4 INJECTION INTRAMUSCULAR; INTRAVENOUS ONCE
Status: ACTIVE | OUTPATIENT
Start: 2021-10-27 | End: 2021-10-28

## 2021-10-27 RX ORDER — IBUPROFEN 400 MG/1
800 TABLET ORAL
Status: DISCONTINUED | OUTPATIENT
Start: 2021-10-30 | End: 2021-10-30 | Stop reason: HOSPADM

## 2021-10-27 RX ADMIN — SODIUM CHLORIDE, SODIUM LACTATE, POTASSIUM CHLORIDE, AND CALCIUM CHLORIDE 125 ML/HR: 600; 310; 30; 20 INJECTION, SOLUTION INTRAVENOUS at 20:16

## 2021-10-27 RX ADMIN — SODIUM CHLORIDE, SODIUM LACTATE, POTASSIUM CHLORIDE, AND CALCIUM CHLORIDE 1000 ML: 600; 310; 30; 20 INJECTION, SOLUTION INTRAVENOUS at 11:58

## 2021-10-27 RX ADMIN — BUPIVACAINE HYDROCHLORIDE 13 MG: 7.5 INJECTION, SOLUTION EPIDURAL; RETROBULBAR at 12:47

## 2021-10-27 RX ADMIN — ONDANSETRON HYDROCHLORIDE 4 MG: 2 INJECTION INTRAMUSCULAR; INTRAVENOUS at 13:06

## 2021-10-27 RX ADMIN — Medication 500 MILLI-UNITS/MIN: at 13:00

## 2021-10-27 RX ADMIN — CEFAZOLIN 2 G: 1 INJECTION, POWDER, FOR SOLUTION INTRAVENOUS at 12:51

## 2021-10-27 RX ADMIN — KETOROLAC TROMETHAMINE 30 MG: 30 INJECTION, SOLUTION INTRAMUSCULAR at 14:06

## 2021-10-27 RX ADMIN — SODIUM CHLORIDE, SODIUM LACTATE, POTASSIUM CHLORIDE, AND CALCIUM CHLORIDE: 600; 310; 30; 20 INJECTION, SOLUTION INTRAVENOUS at 12:38

## 2021-10-27 RX ADMIN — SODIUM CHLORIDE, SODIUM LACTATE, POTASSIUM CHLORIDE, AND CALCIUM CHLORIDE 1000 ML: 600; 310; 30; 20 INJECTION, SOLUTION INTRAVENOUS at 10:50

## 2021-10-27 RX ADMIN — SODIUM CHLORIDE, SODIUM LACTATE, POTASSIUM CHLORIDE, AND CALCIUM CHLORIDE: 600; 310; 30; 20 INJECTION, SOLUTION INTRAVENOUS at 13:03

## 2021-10-27 RX ADMIN — MORPHINE SULFATE 0.2 MG: 1 INJECTION, SOLUTION EPIDURAL; INTRATHECAL; INTRAVENOUS at 12:47

## 2021-10-27 NOTE — PROGRESS NOTES
INITIAL NUTRITION ASSESSMENT     REASON FOR ASSESSMENT:   [x]  RN Referral:    [] MST score >/=2  Malnutrition Screening Tool (MST):  Recently Lost Weight Without Trying: No     Eating Poorly Due to Decreased Appetite: No  MST Score: 0    [] Enteral/Parenteral Nutrition PTA   [x] Pregnant (Not in Labor):  [x] Gestational DM     [] Multigestation   [] Pressure Ulcer/Wound Care needs    NUTRITION ASSESSMENT:   Client History: 32 yrs old Female admitted with single pregnancy, not multigestational pregnancy.  at 40.0 weeks ambulated onto unit for a scheduled repeat induction. Nutrition best practice alert triggered in error. Please consult if dietitian services are indicated and desired.  Thank you.    [] Participated in Interdisciplinary Rounds  [x] [address] [facility] Reviewed/Documented  [] Discharge Nutrition Plan     Kay Farooq RD

## 2021-10-27 NOTE — PROGRESS NOTES
5976  at 40.0 weeks ambulated onto unit for a scheduled repeat induction. Taken to room 2 and oriented to area. Patient using restroom and changing into gown. 1005 EFM and TOCO applied. 6046 [facility] called for admission documents and assessment.  #[id number].    7328 Consents signed. 1048 MELBA Truong RN at bedside initiating IV and drawing labs. 1051 Reviewed reactive NST with Ara Andrew RN. EFM and TOCO discontinued. 18 Dr. Ramona Cordova at bedside discussing [facility]. [facility] Rashard Ashraf at bedside discussing plan of care. 1238 Ambulated with patient to OR 2.    1321 Patient taken back to room 2 on bed by Dr. Ramona Cordova and RN. 1505 Bedside and Verbal shift change report given to ROGER Flores RN (oncoming nurse) by William Marquez RN (offgoing nurse). Report included the following information SBAR, Kardex, Intake/Output, MAR and Recent Results.

## 2021-10-27 NOTE — INTERVAL H&P NOTE
Update History & Physical    The Patient's History and Physical owas reviewed with the patient and I examined the patient. There was no change. The surgical site was confirmed by the patient and me. Plan:  The risk, benefits, expected outcome, and alternative to the recommended procedure have been discussed with the patient. Patient understands and wants to proceed with the procedure.     Electronically signed by Suresh Rondon MD on 10/27/2021 at 12:32 PM

## 2021-10-27 NOTE — ANESTHESIA POSTPROCEDURE EVALUATION
Post-Anesthesia Evaluation and Assessment    Cardiovascular Function/Vital Signs  Visit Vitals  /60 (BP 1 Location: Right upper arm, BP Patient Position: At rest;Lying)   Pulse 77   Temp 37.2 °C (98.9 °F)   Resp 18   Ht 5' 3\" (1.6 m)   Wt 85.3 kg (188 lb)   SpO2 100%   Breastfeeding Unknown   BMI 33.30 kg/m²       Patient is status post Procedure(s):  REPEAT LOW TRANSVERSE  SECTION  PUSHPA 11/3/21. Nausea/Vomiting: Controlled. Postoperative hydration reviewed and adequate. Pain:  Pain Scale 1: Numeric (0 - 10) (10/27/21 1638)  Pain Intensity 1: 3 (10/27/21 163)   Managed. Neurological Status:   Neuro (WDL): Within Defined Limits (10/27/21 1057)   At baseline. Mental Status and Level of Consciousness: Baseline and appropriate for discharge. Pulmonary Status:   O2 Device: None (Room air) (10/27/21 1638)   Adequate oxygenation and airway patent. Complications related to anesthesia: None    Post-anesthesia assessment completed. No concerns. Patient has met all discharge requirements.     Signed By: Terrance Donis MD    2021

## 2021-10-27 NOTE — PROGRESS NOTES
C/o itching. IV benadryl offered, but pt declines it because she does not want to be sleepy from it.

## 2021-10-27 NOTE — ANESTHESIA PREPROCEDURE EVALUATION
Relevant Problems   No relevant active problems       Anesthetic History   No history of anesthetic complications            Review of Systems / Medical History  Patient summary reviewed, nursing notes reviewed and pertinent labs reviewed    Pulmonary  Within defined limits            Pertinent negatives: No sleep apnea     Neuro/Psych   Within defined limits           Cardiovascular                    Comments: Previous h/o preeclampsia   GI/Hepatic/Renal                Endo/Other    Diabetes (gestational-  controlled with DM)    Obesity  Pertinent negatives: No hypothyroidism   Other Findings   Comments: Swedish speaking         Physical Exam    Airway  Mallampati: II  TM Distance: 4 - 6 cm  Neck ROM: normal range of motion        Cardiovascular    Rhythm: regular  Rate: normal         Dental      Comments: Loose upper left incisor; poor gum condition   Pulmonary  Breath sounds clear to auscultation               Abdominal  GI exam deferred       Other Findings            Anesthetic Plan    ASA: 2  Anesthesia type: spinal and general - backup            Anesthetic plan and risks discussed with: Patient and Spouse      Language line

## 2021-10-27 NOTE — PROGRESS NOTES
[facility] bedside [facility]. using SBAR. Assuming care of pt at this time.

## 2021-10-27 NOTE — ANESTHESIA PROCEDURE NOTES
Spinal Block    Start time: 10/27/2021 12:41 PM  End time: 10/27/2021 12:47 PM  Performed by: Giles Gonzalez MD  Authorized by: Giles Gonzalez MD     Pre-procedure: Indications: primary anesthetic  Preanesthetic Checklist: patient identified, risks and benefits discussed, anesthesia consent, site marked, patient being monitored and timeout performed    Timeout Time: 12:41 EDT          Spinal Block:   Patient Position:  Seated  Prep Region:  Lumbar  Prep: Betadine      Location:  L3-4  Technique:  Single shot        Needle:   Needle Type:   Keiko  Needle Gauge:  25 G  Attempts:  2      Events: CSF confirmed, no blood with aspiration and no paresthesia        Assessment:  Insertion:  Uncomplicated  Patient tolerance:  Patient tolerated the procedure well with no immediate complications

## 2021-10-27 NOTE — PROGRESS NOTES
1620 TRANSFER - IN REPORT:    Verbal report received from ROGER Flores RN(name) on Lane Hernandez  being received from L & D(unit) for routine progression of care      Report consisted of patients Situation, Background, Assessment and   Recommendations(SBAR). Information from the following report(s) SBAR, Kardex, Intake/Output, MAR and Recent Results was reviewed with the receiving nurse. Opportunity for questions and clarification was provided. Pt educated on pain management    36 Onboarding teaching reviewed and Assessment completed, no questions at this time. Pt C/O 3/10 soreness to lower abd, declines any pain meds at this time, rest and relaxation encouraged. 0710 Bedside and Verbal shift change report given to  TRI Arteaga RN (oncoming nurse) by Kristin Hidalgo RN (offgoing nurse). Report included the following information SBAR, Kardex, Intake/Output, MAR and Recent Results.

## 2021-10-27 NOTE — OP NOTES
Postoperative Note    Patient: Shani Lim  YOB: 1995  MRN: [id number]    Date of Procedure: 10/27/2021     Pre-Op Diagnosis: IUP TERM, PREVIOUS  SECTION    Post-Op Diagnosis: Same as preoperative diagnosis. Procedure(s):  REPEAT LOW TRANSVERSE  SECTION  PUSHPA 11/3/21    Surgeon(s):  Usman Villalta MD    Surgical Assistant: None    Anesthesia: Regional     Estimated Blood Loss (mL): 384    Complications: None    Specimens: * No specimens in log *     Implants: * No implants in log *    Drains: * No LDAs found *    Findings: viable male infant, Apgar's 9,9, grossly normal uterus, ovaries and tubes    Procedure:    Patient was taken to the OR after informed consent had been obtained. Spinal epidural was then placed. She was then placed in a dorsal supine position with a left lateral tilt. She was prepped and draped in a sterile fashion. Time out was completed. Attention was turned to the abdomen and an Allis test confirmed adequate anesthesia. A Pfannenstiel skin incision was made above the symphysis pubis through a previous scar. The incision was carried down to the fascia. The fascia was opened in the midline. The subcutaneous tissue and fascia were extended bilaterally. The rectus muscle was divided bluntly. The peritoneum was entered. The bladder blade was inserted. The uterus was scored in the lower uterine segment and then entered in the midline. The uterine incision was extended bilaterally, transversly. The fetal head was flexed, pressure was applied to the abdomen and the fetal head was delivered followed by the body. The cord was clamped and cut. The placenta was manually extracted. The uterus was cleared of all clot and debris. The incision was closed with 0 Vicryl in a running fashion. A second imbricating layer of 0 Vicryl was placed. Two figures-of-eight along the right margin of the uterine incision. Hemostasis was verified.  The fascia was closed in a running fashion with 0 Vicryl. The skin was closed with Ensorb dissolvable sutures. The counts were correct. The mother and child tolerated the procedure well.      Bro Ramírez DO          Electronically Signed by General Roosevelt MD on 10/27/2021 at 1:13 PM

## 2021-10-28 LAB
HCT VFR BLD AUTO: 34.7 % (ref 35–45)
HGB BLD-MCNC: 11.3 G/DL (ref 12–16)

## 2021-10-28 PROCEDURE — 65270000029 HC RM PRIVATE

## 2021-10-28 PROCEDURE — 85018 HEMOGLOBIN: CPT

## 2021-10-28 PROCEDURE — 36415 COLL VENOUS BLD VENIPUNCTURE: CPT

## 2021-10-28 PROCEDURE — 77030019905 HC CATH URETH INTMIT MDII -A

## 2021-10-28 PROCEDURE — 74011250636 HC RX REV CODE- 250/636: Performed by: OBSTETRICS & GYNECOLOGY

## 2021-10-28 RX ORDER — OXYCODONE AND ACETAMINOPHEN 5; 325 MG/1; MG/1
1-2 TABLET ORAL
Qty: 20 TABLET | Refills: 0 | Status: SHIPPED | OUTPATIENT
Start: 2021-10-28 | End: 2021-10-31

## 2021-10-28 RX ORDER — IBUPROFEN 800 MG/1
800 TABLET ORAL
Qty: 60 TABLET | Refills: 1 | Status: SHIPPED | OUTPATIENT
Start: 2021-10-30

## 2021-10-28 RX ADMIN — KETOROLAC TROMETHAMINE 30 MG: 30 INJECTION, SOLUTION INTRAMUSCULAR at 09:34

## 2021-10-28 RX ADMIN — SODIUM CHLORIDE, SODIUM LACTATE, POTASSIUM CHLORIDE, AND CALCIUM CHLORIDE 125 ML/HR: 600; 310; 30; 20 INJECTION, SOLUTION INTRAVENOUS at 04:03

## 2021-10-28 RX ADMIN — KETOROLAC TROMETHAMINE 30 MG: 30 INJECTION, SOLUTION INTRAMUSCULAR at 02:23

## 2021-10-28 RX ADMIN — KETOROLAC TROMETHAMINE 30 MG: 30 INJECTION, SOLUTION INTRAMUSCULAR at 15:38

## 2021-10-28 RX ADMIN — KETOROLAC TROMETHAMINE 30 MG: 30 INJECTION, SOLUTION INTRAMUSCULAR at 22:24

## 2021-10-28 NOTE — PROGRESS NOTES
Problem: Pain  Goal: *Control of Pain  Outcome: Progressing Towards Goal     Problem:  Delivery: Postpartum Day 1  Goal: Activity/Safety  Outcome: Progressing Towards Goal  Goal: Consults, if ordered  Outcome: Progressing Towards Goal  Goal: Diagnostic Test/Procedures  Outcome: Progressing Towards Goal  Goal: Nutrition/Diet  Outcome: Progressing Towards Goal  Goal: Discharge Planning  Outcome: Progressing Towards Goal  Goal: Medications  Outcome: Progressing Towards Goal  Goal: Respiratory  Outcome: Progressing Towards Goal  Goal: Treatments/Interventions/Procedures  Outcome: Progressing Towards Goal  Goal: Psychosocial  Outcome: Progressing Towards Goal  Goal: *Vital signs within defined limits  Outcome: Progressing Towards Goal  Goal: *Labs within defined limits  Outcome: Progressing Towards Goal  Goal: *Hemodynamically stable  Outcome: Progressing Towards Goal  Goal: *Optimal pain control at patient's stated goal  Outcome: Progressing Towards Goal  Goal: *Participates in infant care  Outcome: Progressing Towards Goal  Goal: *Demonstrates progressive activity  Outcome: Progressing Towards Goal  Goal: *Tolerating diet  Outcome: Progressing Towards Goal  Goal: *Performs self perineal care  Outcome: Progressing Towards Goal

## 2021-10-28 NOTE — PROGRESS NOTES
Assumed care of pt.  0840-assessment completed. Abs. Dressing intact. Denies needs. 0930-ambulated to bathroom. Voided without diff. 1110-awake in bed feeding infant. 1200-VSS. Denies needs. 1345-resting in bed. 1415-resting in bed. 1545-reassessment completed. 1600-up to shower. Dressing removed. 1700-resting in bed. Denies needs. 1820-visitors in room. Denies needs. 1925-Bedside and Verbal shift change report given to TRI Treviño RN  (oncoming nurse) by ROGER Teague LPN (offgoing nurse). Report given with SBAR, Kardex, Intake/Output, MAR and Recent Results.

## 2021-10-28 NOTE — PROGRESS NOTES
5259: Bedside and Verbal shift change report given to ROGRE Teague LPN (oncoming nurse) by TRI Castrejon RN (offgoing nurse).  Report included the following information SBAR, Kardex, Procedure Summary, Intake/Output, MAR and Recent Results.

## 2021-10-28 NOTE — PROGRESS NOTES
1910: Bedside and Verbal shift change report given to TRI Mancilla RN (oncoming nurse) by Shani Elise RN (offgoing nurse). Report included the following information SBAR, Kardex, Procedure Summary, Intake/Output, MAR and Recent Results.

## 2021-10-28 NOTE — PROGRESS NOTES
Post-Operative  Day 1    Myranda Turner  [id number]      Information for the patient's :  Marian Lopez [[id number]]   , Low Transverse    Patient doing well without significant complaint. Tolerating diet, yes flatus, murphy removed. Patient is breastfeeding. Lochia reportedly normal.    Vitals:  Visit Vitals  /63 (BP 1 Location: Right upper arm, BP Patient Position: At rest;Lying)   Pulse 80   Temp 98.3 °F (36.8 °C)   Resp 18   Ht 5' 3\" (1.6 m)   Wt 85.3 kg (188 lb)   SpO2 99%   Breastfeeding Unknown   BMI 33.30 kg/m²     Temp (24hrs), Av.6 °F (37 °C), Min:98.1 °F (36.7 °C), Max:98.9 °F (37.2 °C)      Last 24hr Input/Output:    Intake/Output Summary (Last 24 hours) at 10/28/2021 1008  Last data filed at 10/28/2021 0930  Gross per 24 hour   Intake 650 ml   Output 1405 ml   Net -755 ml        Exam:    Patient without distress. Lungs clear. Abdomen, bowel sounds present, soft, expected tenderness, fundus firm 0   Wound dressing intact. Labs:   Recent Results (from the past 24 hour(s))   CBC WITH AUTOMATED DIFF    Collection Time: 10/27/21 10:48 AM   Result Value Ref Range    WBC 7.2 4.6 - 13.2 K/uL    RBC 3.87 (L) 4.20 - 5.30 M/uL    HGB 11.6 (L) 12.0 - 16.0 g/dL    HCT 35.9 35.0 - 45.0 %    MCV 92.8 78.0 - 100.0 FL    MCH 30.0 24.0 - 34.0 PG    MCHC 32.3 31.0 - 37.0 g/dL    RDW 12.7 11.6 - 14.5 %    PLATELET 802 189 - 769 K/uL    MPV 10.7 9.2 - 11.8 FL    NEUTROPHILS 64 40 - 73 %    LYMPHOCYTES 23 21 - 52 %    MONOCYTES 12 (H) 3 - 10 %    EOSINOPHILS 1 0 - 5 %    BASOPHILS 0 0 - 2 %    ABS. NEUTROPHILS 4.6 1.8 - 8.0 K/UL    ABS. LYMPHOCYTES 1.6 0.9 - 3.6 K/UL    ABS. MONOCYTES 0.9 0.05 - 1.2 K/UL    ABS. EOSINOPHILS 0.1 0.0 - 0.4 K/UL    ABS.  BASOPHILS 0.0 0.0 - 0.1 K/UL    DF AUTOMATED     TYPE & SCREEN    Collection Time: 10/27/21 10:48 AM   Result Value Ref Range    Crossmatch Expiration 10/30/2021,2359     ABO/Rh(D) O POSITIVE     Antibody screen NEG    GLUCOSE, RANDOM    Collection Time: 10/27/21 10:48 AM   Result Value Ref Range    Glucose 96 74 - 99 mg/dL   HGB & HCT    Collection Time: 10/28/21  3:00 AM   Result Value Ref Range    HGB 11.3 (L) 12.0 - 16.0 g/dL    HCT 34.7 (L) 35.0 - 45.0 %     Assessment: Post-Op day 1, stable. Plan:   1. Routine post-operative care as per  day one orders. 2. Plan for discharge home tomorrow with follow-up at 6 weeks postpartum.       Aydee Hernandez CNM  10/28/2021  10:08 AM

## 2021-10-28 NOTE — PROGRESS NOTES
Bedside and Verbal shift change report given to TRI LeosRN $ ChintanRN (oncoming nurse) by ROGER Teague LPN (offgoing nurse). Report included the following information SBAR, Kardex, Procedure Summary, Intake/Output, MAR and Recent Results. 0143:Infant sleeping on the mother's bed. Educated mother about safety. placed infant in basinet.

## 2021-10-28 NOTE — PROGRESS NOTES
1910: Bedside and Verbal shift change report given to TRI Zaragoza, RN (oncoming nurse) by Johnathan Oneal RN (offgoing nurse). Report included the following information SBAR, Kardex, Procedure Summary, Intake/Output, MAR and Recent Results. 0700: Pt unable to void after 2 attempts and is 6 hours post catheter removal. Called MONIK Rowe. Orders received for straight cath.     7059: Bedside and Verbal shift change report given to ROGER Teague LPN (oncoming nurse) by TRI Zaragoza RN (offgoing nurse). Report included the following information SBAR, Kardex, Procedure Summary, Intake/Output, MAR and Recent Results. 0720: 2 attempts made on straight catheterizing the pt; no success. Pt asked for a break and will retry in a little bit. 3862: Pt would like to try to void again. Pt assisted to the bathroom; voided 600mL. Assisted pt back to bed. No further needs at this time.

## 2021-10-28 NOTE — PROGRESS NOTES
10/28/2021  12:12 PM    Anesthesia Spinal Narcotic Post-Op Rounding Note      Referring physician: Frederick Perez,*   Patient status post Procedure(s):  REPEAT LOW TRANSVERSE  SECTION  PUSHPA 11/3/21 on 10/27/2021    POST OP Day # 1    Visit Vitals  BP 97/60 (BP 1 Location: Left upper arm)   Pulse 84   Temp 36.9 °C (98.4 °F)   Resp 16   Ht 5' 3\" (1.6 m)   Wt 85.3 kg (188 lb)   SpO2 100%   Breastfeeding Unknown   BMI 33.30 kg/m²             Patient rates pain 2 out of 10. Pain is subjectively rated by patient as mild . Pt Icelandic speaking only with no  available. Interviewed RN taking care of patient post-partum. Patient denies headache, back ache, subjective fever/chills. No sedation, pruritis, or nausea noted. Pt denies any LE residual numbness or weakness. Ambulating and urinating. No complications, adequate analgesia. Continue current postop pain regimen.       Channing Cowden, MD

## 2021-10-28 NOTE — DISCHARGE SUMMARY
Obstetrical Discharge Summary     Name: Artur Calvillo MRN: [id number]  SSN: [id number]    YOB: 1995  Age: 32 y.o. Sex: female      Admit Date: 10/27/2021    Discharge Date: 10/29/2021     Admitting Physician: Lorie Loera MD     Attending Physician:  Liza Leon MD     Admission Diagnoses: 40 weeks gestation of pregnancy [Z3A.40]  Gestational diabetes [O24.419]    Discharge Diagnoses:   Information for the patient's :  Bridgette Nodaway [[id number]]   Delivery of a 4.57 kg male infant via , Low Transverse on 10/27/2021 at 12:58 PM  by Lorie Loera. Apgars were 9  and 9 . Additional Diagnoses:   Hospital Problems  Never Reviewed        Codes Class Noted POA    Gestational diabetes ICD-10-CM: O23.80  ICD-9-CM: 648.80  10/27/2021 Unknown        40 weeks gestation of pregnancy ICD-10-CM: Z3A.40  ICD-9-CM: V22.2  10/27/2021 Unknown           No results found for: RUBELLAEXT, GRBSEXT    Immunization(s): There is no immunization history for the selected administration types on file for this patient. Hospital Course: Normal hospital course following the delivery. Patient Instructions:   Current Discharge Medication List      START taking these medications    Details   ibuprofen (MOTRIN) 800 mg tablet Take 1 Tablet by mouth every eight (8) hours as needed for Pain. Qty: 60 Tablet, Refills: 1      oxyCODONE-acetaminophen (PERCOCET) 5-325 mg per tablet Take 1-2 Tablets by mouth every six (6) hours as needed for Pain for up to 3 days. Max Daily Amount: 8 Tablets. Qty: 20 Tablet, Refills: 0    Associated Diagnoses: S/P          CONTINUE these medications which have NOT CHANGED    Details   Iron BisGl &PS Vxy-C-I14-FA-[id number] 668-24-76-5 mg-mg-mcg-mg cap Take  by mouth. cholecalciferol (Vitamin D3) 25 mcg (1,000 unit) cap Take  by mouth daily.          STOP taking these medications       clotrimazole (MYCELEX) 1 % vaginal cream Comments: Reason for Stopping:               Reference my discharge instructions.     Follow-up Appointments   Procedures    FOLLOW UP VISIT Appointment in: 6 Weeks     Standing Status:   Standing     Number of Occurrences:   1     Order Specific Question:   Appointment in     Answer:   6 Weeks        Signed By:  Deepak Leach CNM     October 28, 2021

## 2021-10-29 PROCEDURE — 65270000029 HC RM PRIVATE

## 2021-10-29 PROCEDURE — 74011250636 HC RX REV CODE- 250/636: Performed by: OBSTETRICS & GYNECOLOGY

## 2021-10-29 PROCEDURE — 74011250637 HC RX REV CODE- 250/637: Performed by: OBSTETRICS & GYNECOLOGY

## 2021-10-29 RX ADMIN — KETOROLAC TROMETHAMINE 30 MG: 30 INJECTION, SOLUTION INTRAMUSCULAR at 05:35

## 2021-10-29 RX ADMIN — KETOROLAC TROMETHAMINE 30 MG: 30 INJECTION, SOLUTION INTRAMUSCULAR at 11:33

## 2021-10-29 RX ADMIN — ACETAMINOPHEN 650 MG: 325 TABLET ORAL at 20:01

## 2021-10-29 NOTE — PROGRESS NOTES
Assumed care of pt.  0920-assessment completed. Used [facility] [facility] [employer] Yakov Oneal # [id number]. Denies needs. 1030-up to bathroom. Denies needs. 1133-last dose of Toradol given by MONIK Garcia RN. INT d/c'd.  1305-feeding infant. Denies needs. 1505-VSS. Reassessment completed. 1630-resting in bed. Denies needs. 1800-resting in bed.  1925-Bedside and Verbal shift change report given to Sebas Reed RN  (oncoming nurse) by ROGER Teague LPN (offgoing nurse). Report given with SBAR, Kardex, Intake/Output, MAR and Recent Results.

## 2021-10-29 NOTE — PROGRESS NOTES
1925: Bedside and Verbal shift change report given to TRI Dexter RN (oncoming nurse) by ROGER Teague LPN (offgoing nurse). Report included the following information SBAR, Kardex, Procedure Summary, Intake/Output, MAR and Recent Results. 6800: Bedside and Verbal shift change report given to ROGER Teague LPN (oncoming nurse) by TRI Galeas RN (offgoing nurse). Report included the following information SBAR, Kardex, Procedure Summary, Intake/Output, MAR and Recent Results.

## 2021-10-29 NOTE — PROGRESS NOTES
1925 Bedside report received from [facility], [facility]. Pt stable. Needs addressed. Whiteboard updated. Bed in lowest position and call bell in reach. 1945  used to explain patient's pain medication options and plan of care. 2002 Pt. joined at bedside by baby. AAOx4. Pain 3/10 and administered pain medication. Fundus firm at U, scant rubra lochia. No clots noted. Educated on signs and symptoms to report and plan of care. No further questions or concerns at this time. Callbell within reach. Bed in lowest position. 2250 Pt standing at baby's bedside changing diaper. 0053 Pt in bed on phone. No concerns or needs at this time. 0247 Pt resting with call bell in reach. 0450 Pt resting with call bell in reach. 0710 Bedside shift change report given to Preeti Castellon RN (oncoming nurse) by Adilson Velazco RN (offgoing nurse). Report included the following information SBAR, Kardex, MAR and Recent Results.

## 2021-10-29 NOTE — PROGRESS NOTES
0710:Bedside and Verbal shift change report given to ROGER Teague LPN (oncoming nurse) by TRI Leos,NANDA& NANDA Woodson (offgoing nurse). Report included the following information SBAR, Kardex, Procedure Summary, Intake/Output, MAR and Recent Results.

## 2021-10-30 VITALS
DIASTOLIC BLOOD PRESSURE: 65 MMHG | HEIGHT: 63 IN | BODY MASS INDEX: 33.31 KG/M2 | HEART RATE: 82 BPM | WEIGHT: 188 LBS | SYSTOLIC BLOOD PRESSURE: 107 MMHG | OXYGEN SATURATION: 99 % | RESPIRATION RATE: 17 BRPM | TEMPERATURE: 98.6 F

## 2021-10-30 NOTE — DISCHARGE INSTRUCTIONS
Patient Education     El período posparto: Instrucciones de cuidado-Instrucciones de cuidado  Postpartum: Care Instructions  Instrucciones de cuidado  Después del parto (período posparto), taylor cuerpo pasa por muchos cambios. Algunos de estos cambios suceden marline varias semanas. [address], el cuerpo comienza a recuperarse y se prepara para el amamantamiento. Es posible que [address] se sienta sensible. Las hormonas pueden cambiar taylor estado de ánimo sin advertencia y sin motivo aparente. Marline las primeras [address] después del parto, muchas mujeres tienen emociones que [place] de chau a margarette. Es posible que le resulte difícil dormir. Es posible que llore mucho. [place] se llama \"melancolía de la maternidad\". Estas emociones abrumadoras suelen desaparecer dentro de unos días o semanas. [place]he es importante hablar con taylor médico acerca de nadira sentimientos. Marline las primeras semanas después del [place], es fácil cansarse demasiado o sentirse [place]. No mar demasiados esfuerzos. Descanse cada vez que pueda, acepte la ayuda de los demás, coma evita y celestina abundantes líquidos. En el primer par de Garrison Kraftid de bishop a paco, es posible que taylor médico o partera deseen verla y hacer un plan para cualquier atención de seguimiento que usted pueda necesitar. Probablemente tendrá Franchesca Susannah hailey completa de posparto dentro de los primeros 3 meses después del [place]. En sarita momento, taylor médico o partera revisarán taylor recuperación del parto. Él o zbigniew también verán cómo está enfrentando usted nadira emociones y conversarán sobre nadira inquietudes y preguntas. La atención de seguimiento es babatunde parte clave de taylor tratamiento y seguridad. Asegúrese de hacer y acudir a todas las citas, y llame a taylor médico si está teniendo problemas. También es babatunde buena idea saber los resultados de nadira exámenes y mantener babatunde lista de los medicamentos que geovanna. ¿Cómo puede cuidarse en el hogar?   · Duerma o descanse cuando taylor bebé lo mar. · Si es posible, permita que jailene familiares o jailene amigos la ayuden con las tareas del hogar. No intente hacerlo todo usted angie. · Si tiene hemorroides o hinchazón o dolor alrededor de la abertura de la vagina, pruebe aplicarse frío y calor. Puede aplicarse hielo o babatunde compresa fría en la gui marline 10 a 20 minutos cada vez. Póngase un paño melo entre el hielo y la piel. Además, trate de sentarse en algunos centímetros de agua tibia (baño de asiento) 3 veces al día y después de las evacuaciones. · [employer] analgésicos (medicamentos para el dolor) exactamente según las indicaciones. ? Si el médico le recetó un analgésico, tómelo según las indicaciones. ? Si no está tomando un analgésico recetado, pregúntele a taylor médico si puede tammie jermaine de [employer]. · Coma más fibra para evitar el estreñimiento. Incluya alimentos albert panes y cereales integrales, verduras crudas, frutas crudas y secas, y frijoles (habichuelas). · Ludivina mucho líquido. Si tiene babatunde enfermedad renal, cardíaca o hepática y tiene que restringir los líquidos, hable con taylor médico antes de aumentar la cantidad de líquido que toñito. · No se lave el interior de la vagina con líquidos (lavados vaginales). · Si tiene puntos de sutura, mantenga la gui limpia con agua tibia, ya sea echándola o rociándola sobre la gui externa de la vagina y el ano después de usar el baño. · Lleve babatunde lista de preguntas para hacerle a taylor médico o partera. Jailene preguntas podrían ser acerca de lo siguiente:  ? Cambios en los senos, albert bultos o sensibilidad. ? Cuándo esperar que regrese taylor período menstrual.  ? Qué forma de anticoncepción es la más adecuada para usted. ? El peso que aumentó marline el [facility]. ? Las opciones de [address]. ? Meera Martino y bebidas son mejores para usted, sobre todo si está amamantando. ? Problemas que podría tener con la lactancia. ? Cuándo puede [employer].  Gee Flaherty aditi Sima Pulido hablar sobre lubricantes vaginales. ? Cualquier sentimiento de tristeza o inquietud que tenga. ¿Cuándo debe pedir ayuda? Llame al 911  en cualquier momento que considere que necesita atención de [place]. Por ejemplo, llame si:    · Tiene pensamientos de lastimarse o de hacerle daño a taylor bebé o a otra persona.     · Se desmayó (perdió el conocimiento).     · Tiene dolor en el pecho, le falta el aire o tose sue.     · Tiene convulsiones. Llame a taylor médico ahora mismo o busque atención médica de inmediato si:    · Taylor sangrado vaginal parece hacerse más abundante.     · Se siente mareada o aturdida, o que está a punto de desmayarse.     · Tiene fiebre.     · Tiene dolor abdominal nuevo o más intenso.     · Tiene síntomas de un coágulo de sue en la pierna (que se llama trombosis venosa profunda), albert:  ? Dolor en la pantorrilla, el muslo, la jasen o detrás de la rodilla. ? Enrojecimiento e hinchazón en la pierna o la jasen.     · Tiene señales de preeclampsia, albert:  ? Hinchazón repentina de la arsenio, las jaylin o los pies. ? Nuevos problemas de la vista (albert oscurecimiento, al borroso o al puntos). ? Dolor de tank intenso. Preste especial atención a los cambios en taylor jarocho y asegúrese de comunicarse con taylor médico si:    · Tiene nuevo flujo vaginal o jovana empeora.     · Se siente margarette o deprimida.     · Tiene problemas con los senos o el amamantamiento. ¿Dónde puede encontrar más información en inglés? Vaya a [email or web address]  Lalita Randall [id number] en la búsqueda para aprender más acerca de \"El período posparto: Instrucciones de cuidado-Instrucciones de cuidado. \"  Revisado: 16 junio, 2021               Versión del contenido: 13.0  © 0942-9532 [employer]wise, Incorporated. Las instrucciones de cuidado fueron adaptadas bajo licencia por [employer] (which disclaims liability or warranty for this information).  Si usted tiene [facility] afección médica o [employer] estas instrucciones, siempre pregunte a taylor profesional de jarocho. [facility] niega toda garantía o responsabilidad por taylor uso de esta información.

## 2021-10-30 NOTE — PROGRESS NOTES
Problem: Pain  Goal: *Control of Pain  Outcome: Progressing Towards Goal     Problem:  Delivery: Postpartum Day 2  Goal: Discharge Planning  Outcome: Progressing Towards Goal  Goal: Medications  Outcome: Progressing Towards Goal  Goal: Psychosocial  Outcome: Progressing Towards Goal  Goal: *Vital signs within defined limits  Outcome: Progressing Towards Goal  Goal: *Labs within defined limits  Outcome: Progressing Towards Goal  Goal: *Hemodynamically stable  Outcome: Progressing Towards Goal  Goal: *Optimal pain control at patient's stated goal  Outcome: Progressing Towards Goal  Goal: *Demonstrates progressive activity  Outcome: Progressing Towards Goal

## 2021-10-30 NOTE — PROGRESS NOTES
Problem:  Delivery: Postpartum Day 3  Goal: Activity/Safety  Outcome: Progressing Towards Goal  Goal: Consults, if ordered  Outcome: Progressing Towards Goal  Goal: Nutrition/Diet  Outcome: Progressing Towards Goal  Goal: Discharge Planning  Outcome: Progressing Towards Goal  Goal: Medications  Outcome: Progressing Towards Goal  Goal: Treatments/Interventions/Procedures  Outcome: Progressing Towards Goal  Goal: Psychosocial  Outcome: Progressing Towards Goal     Problem: Pain  Goal: *Control of Pain  Outcome: Progressing Towards Goal

## 2021-10-30 NOTE — PROGRESS NOTES
Bedside and Verbal shift change report given to Kristin Hidalgo RN (oncoming nurse) by Dioni Woodward RN (offgoing nurse). Report included the following information SBAR, Kardex, Intake/Output, MAR and Recent Results. Pt educated on pain management, pt verbalized understanding. 3151 Assessment completed. Formula provided to mom and educated on infant's stomach size, WNL volume intake in , how to safely prepare formula, bottle hygiene, appropriate way to feed infant with bottle, and burping techniques. Handout given for reinforcement. Mom verbalized understanding and no questions at this time. 1146 AVS and discharge teaching reviewed and e-signed, arm bands verified and matching. Pt discharged home with baby both in stable condition.

## (undated) DEVICE — STRAP,POSITIONING,KNEE/BODY,FOAM,4X60": Brand: MEDLINE

## (undated) DEVICE — SPONGE GZ W4XL4IN COT 12 PLY TYP VII WVN C FLD DSGN

## (undated) DEVICE — SOL IRRIGATION INJ NACL 0.9% 500ML BTL

## (undated) DEVICE — PAD,ABDOMINAL,5"X9",ST,LF,25/BX: Brand: MEDLINE INDUSTRIES, INC.

## (undated) DEVICE — GLOVE ORANGE PI 8   MSG9080

## (undated) DEVICE — STAPLER SKIN SQ 30 ABSRB STPL DISP INSORB

## (undated) DEVICE — 3L THIN WALL CAN: Brand: CRD

## (undated) DEVICE — BSMI CSECTION BIRTHING PACK: Brand: MEDLINE INDUSTRIES, INC.

## (undated) DEVICE — SUTURE VCRL SZ 0 L36IN ABSRB UD L36MM CT-1 1/2 CIR J946H

## (undated) DEVICE — GARMENT,MEDLINE,DVT,INT,CALF,MED, GEN2: Brand: MEDLINE

## (undated) DEVICE — INTENDED FOR TISSUE SEPARATION, AND OTHER PROCEDURES THAT REQUIRE A SHARP SURGICAL BLADE TO PUNCTURE OR CUT.: Brand: BARD-PARKER ® CARBON RIB-BACK BLADES